# Patient Record
Sex: FEMALE | Race: WHITE | NOT HISPANIC OR LATINO | Employment: FULL TIME | URBAN - METROPOLITAN AREA
[De-identification: names, ages, dates, MRNs, and addresses within clinical notes are randomized per-mention and may not be internally consistent; named-entity substitution may affect disease eponyms.]

---

## 2021-01-15 ENCOUNTER — LAB (OUTPATIENT)
Dept: LAB | Facility: HOSPITAL | Age: 56
End: 2021-01-15

## 2021-01-15 ENCOUNTER — OFFICE VISIT (OUTPATIENT)
Dept: ENDOCRINOLOGY | Facility: CLINIC | Age: 56
End: 2021-01-15

## 2021-01-15 VITALS
TEMPERATURE: 96.8 F | WEIGHT: 279 LBS | SYSTOLIC BLOOD PRESSURE: 140 MMHG | OXYGEN SATURATION: 98 % | HEART RATE: 67 BPM | DIASTOLIC BLOOD PRESSURE: 82 MMHG | BODY MASS INDEX: 41.2 KG/M2

## 2021-01-15 DIAGNOSIS — R79.89 LOW TSH LEVEL: Primary | ICD-10-CM

## 2021-01-15 PROCEDURE — 84439 ASSAY OF FREE THYROXINE: CPT | Performed by: PHYSICIAN ASSISTANT

## 2021-01-15 PROCEDURE — 99243 OFF/OP CNSLTJ NEW/EST LOW 30: CPT | Performed by: PHYSICIAN ASSISTANT

## 2021-01-15 PROCEDURE — 83520 IMMUNOASSAY QUANT NOS NONAB: CPT | Performed by: PHYSICIAN ASSISTANT

## 2021-01-15 PROCEDURE — 84480 ASSAY TRIIODOTHYRONINE (T3): CPT | Performed by: PHYSICIAN ASSISTANT

## 2021-01-15 PROCEDURE — 84443 ASSAY THYROID STIM HORMONE: CPT | Performed by: PHYSICIAN ASSISTANT

## 2021-01-15 PROCEDURE — 86376 MICROSOMAL ANTIBODY EACH: CPT | Performed by: PHYSICIAN ASSISTANT

## 2021-01-15 PROCEDURE — 84445 ASSAY OF TSI GLOBULIN: CPT | Performed by: PHYSICIAN ASSISTANT

## 2021-01-15 RX ORDER — OMEPRAZOLE 40 MG/1
40 CAPSULE, DELAYED RELEASE ORAL DAILY
COMMUNITY

## 2021-01-15 RX ORDER — DULOXETIN HYDROCHLORIDE 30 MG/1
30 CAPSULE, DELAYED RELEASE ORAL DAILY
COMMUNITY

## 2021-01-15 NOTE — PROGRESS NOTES
Chief Complaint:   Pamela Lindsay is a 55 y.o. female who is being seen today for low TSH. Referred by Radha Salcido APRN     HPI     56 yo female with hypertension comes in today for consultation for low TSH. Low TSH was found on routine annual labs 9/2020.   She has had palpitations for the past year. They are not frequent though. When it happens it feels like heart is beating very hard. Lasts about 5 minutes. Self resolving. No associated symptoms. She attributes to anxiety.    She has an essential tremor for which she takes propranolol, it also helps with palpitations.   She thinks tremor worsened about a year ago and propranolol dose was increased.   If she misses propranolol then palpitations are more frequent.   She c/o hair loss for the past several months.   Skin in dry, more so recently during winter.   She is on Contrave and appetite is stable on that. Stopped smoking when she started contrave. It does help with hunger/satiety. Has not lost any weight on it.   She is a pharmacist.     Hx of radiation to head/neck: no  Family hx of thyroid cancer: no    The following portions of the patient's history were reviewed and updated as appropriate: allergies, current medications, past family history, past medical history, past social history, past surgical history and problem list.    Review of Systems  Review of Systems   Constitutional:        Unable to lose weight   Cardiovascular: Positive for palpitations.   Skin:        Dry skin, hair loss   Neurological: Positive for tremors (chronic essential tremor).   All other systems reviewed and are negative.       Current medications:  Current Outpatient Medications   Medication Sig Dispense Refill   • DULoxetine (CYMBALTA) 30 MG capsule Take 30 mg by mouth Daily.     • DULoxetine (CYMBALTA) 60 MG capsule Take 90 mg by mouth Daily.     • ibuprofen (ADVIL,MOTRIN) 200 MG tablet Take 200 mg by mouth Every 6 (Six) Hours As Needed for Mild Pain .     • lisinopril  Patient is calling to request refill of medication. Medication(s) have been loaded     Medication not on file Simvistatin      PCP: formerly Dr Rizo / franki est with a new PCP    Preferred contact number#      mobile [phone]       Pharmacy: [facility]    Patient instructed to call pharmacy directly for future refills.      Advised patient that the nurse will call if there are questions or concerns, otherwise refill processing may take up to 72 hours.            (PRINIVIL,ZESTRIL) 40 MG tablet Take 40 mg by mouth Daily.     • Naltrexone-buPROPion HCl (CONTRAVE PO) Take  by mouth.     • omeprazole (priLOSEC) 40 MG capsule Take 40 mg by mouth Daily.     • propranolol LA (INDERAL LA) 60 MG 24 hr capsule Take 120 mg by mouth Daily.       No current facility-administered medications for this visit.        Physical Exam   Vitals:    01/15/21 1409   BP: 140/82   Pulse: 67   Temp: 96.8 °F (36 °C)   SpO2: 98%   Body mass index is 41.2 kg/m².  Physical Exam  Constitutional:       General: She is not in acute distress.     Appearance: She is well-developed. She is not diaphoretic.   HENT:      Head: Normocephalic.      Right Ear: External ear normal.      Left Ear: External ear normal.      Mouth/Throat:      Pharynx: No oropharyngeal exudate.   Eyes:      General: Lids are normal.         Right eye: No discharge.         Left eye: No discharge.      Conjunctiva/sclera: Conjunctivae normal.      Pupils:      Right eye: Pupil is reactive.      Left eye: Pupil is reactive.   Neck:      Thyroid: No thyroid mass or thyromegaly.      Vascular: No JVD.      Trachea: No tracheal deviation.   Cardiovascular:      Rate and Rhythm: Normal rate and regular rhythm.      Heart sounds: Normal heart sounds. No murmur.   Pulmonary:      Effort: Pulmonary effort is normal. No respiratory distress.      Breath sounds: Normal breath sounds. No wheezing.   Abdominal:      General: Bowel sounds are normal.      Palpations: Abdomen is soft.      Tenderness: There is no abdominal tenderness.   Musculoskeletal:         General: No tenderness.   Lymphadenopathy:      Cervical: No cervical adenopathy.   Skin:     General: Skin is warm and dry.      Findings: No erythema or rash.   Neurological:      Mental Status: She is alert and oriented to person, place, and time.   Psychiatric:         Speech: Speech normal.         Behavior: Behavior normal.         Thought Content: Thought content normal.         Labs  and Imaging   No results found for: TSH, [id number], [id number], THYROIDAB   Labs reviewed:  9/18/2020- TSH <0.02, FT4 1.27, AST 38, ALT 34, , triglycerides 76    Assessment / Plan     Diagnoses and all orders for this visit:    1. Low TSH level (Primary)  -     TSH  -     T4, Free  -     T3  -     Thyroid Stimulating Immunoglobulin  -     Thyrotropin Receptor Antibody  -     Thyroid Peroxidase Antibody        Problem List Items Addressed This Visit        Other    Low TSH level - Primary    Relevant Orders    TSH    T4, Free    T3    Thyroid Stimulating Immunoglobulin    Thyrotropin Receptor Antibody    Thyroid Peroxidase Antibody        Diagnosis was discussed and reviewed with the patient including the advantages of drug therapy.        1. Patient appears clinically slightly hyperthyroid. Discussed possible etiologies of hyperthyroidism including thyroiditis, Graves' disease, and toxic nodule. Repeat TFTs today and add above abs. Continue propranolol.   2. Patient will return to our office in 2 months.  3. The risks and benefits of my recommendations, as well as other treatment options were discussed with the patient today. Questions were answered.    Anderson Solomon PA-C

## 2021-01-16 LAB
T3 SERPL-MCNC: 97.5 NG/DL (ref 80–200)
T4 FREE SERPL-MCNC: 1.09 NG/DL (ref 0.93–1.7)
TSH SERPL DL<=0.05 MIU/L-ACNC: 0.52 UIU/ML (ref 0.27–4.2)

## 2021-01-18 LAB
THYROPEROXIDASE AB SERPL-ACNC: 12 IU/ML (ref 0–34)
TSH RECEP AB SER-ACNC: <1.1 IU/L (ref 0–1.75)

## 2021-01-19 LAB — TSI SER-ACNC: <0.1 IU/L (ref 0–0.55)

## 2022-03-21 RX ORDER — SODIUM, POTASSIUM,MAG SULFATES 17.5-3.13G
SOLUTION, RECONSTITUTED, ORAL ORAL
Qty: 354 ML | Refills: 0 | Status: SHIPPED | OUTPATIENT
Start: 2022-03-21

## 2024-06-27 ENCOUNTER — OFFICE VISIT (OUTPATIENT)
Dept: NEUROLOGY | Facility: CLINIC | Age: 59
End: 2024-06-27
Payer: COMMERCIAL

## 2024-06-27 VITALS
HEIGHT: 69 IN | SYSTOLIC BLOOD PRESSURE: 122 MMHG | HEART RATE: 58 BPM | BODY MASS INDEX: 38.57 KG/M2 | DIASTOLIC BLOOD PRESSURE: 76 MMHG | WEIGHT: 260.4 LBS

## 2024-06-27 DIAGNOSIS — G25.0 ESSENTIAL TREMOR: Primary | ICD-10-CM

## 2024-06-27 PROCEDURE — 99204 OFFICE O/P NEW MOD 45 MIN: CPT | Performed by: PSYCHIATRY & NEUROLOGY

## 2024-06-27 RX ORDER — PROPRANOLOL HYDROCHLORIDE 160 MG/1
160 CAPSULE, EXTENDED RELEASE ORAL 2 TIMES DAILY
COMMUNITY
Start: 2024-06-17

## 2024-06-27 RX ORDER — TOPIRAMATE 25 MG/1
TABLET ORAL
Qty: 120 TABLET | Refills: 3 | Status: SHIPPED | OUTPATIENT
Start: 2024-06-27

## 2024-06-27 RX ORDER — ONDANSETRON HYDROCHLORIDE 8 MG/1
TABLET, FILM COATED ORAL
COMMUNITY
Start: 2024-06-06

## 2024-06-27 RX ORDER — BUPROPION HYDROCHLORIDE 150 MG/1
150 TABLET ORAL DAILY
COMMUNITY
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
## 2024-06-28 ENCOUNTER — PATIENT ROUNDING (BHMG ONLY) (OUTPATIENT)
Dept: NEUROLOGY | Facility: CLINIC | Age: 59
End: 2024-06-28
Payer: COMMERCIAL

## 2025-02-10 RX ORDER — TOPIRAMATE 50 MG/1
50 TABLET, FILM COATED ORAL 2 TIMES DAILY
Qty: 60 TABLET | Refills: 5 | Status: SHIPPED | OUTPATIENT
Start: 2025-02-10

## 2025-02-10 RX ORDER — TOPIRAMATE 25 MG/1
TABLET, FILM COATED ORAL
Qty: 120 TABLET | Refills: 3 | OUTPATIENT
Start: 2025-02-10

## 2025-02-10 NOTE — TELEPHONE ENCOUNTER
Denied 25mg (titrating dose) and ordered 50mg      Rx Refill Note  Requested Prescriptions     Pending Prescriptions Disp Refills    topiramate (TOPAMAX) 25 MG tablet [Pharmacy Med Name: TOPIRAMATE 25 MG TABLET] 120 tablet 3     Sig: TAKE 1 TABLET BY MOUTH 2 TIMES A DAY FOR 7 DAYS THEN TAKE 2 TABLETS BY MOUTH TWICE A DAY      Last filled:    Last office visit with prescribing clinician: 6/27/2024      Next office visit with prescribing clinician: Visit date not found     Celeste Vang MA  02/10/25, 09:26 EST